# Patient Record
(demographics unavailable — no encounter records)

---

## 2024-11-04 NOTE — IMAGING
[de-identified] : On examination of her right foot and ankle she has swelling and ecchymosis over the lateral aspect of the foot and ankle.  She is mildly tender over the lateral malleolus, she is tender over the ATFL CFL and PTFL.  No tenderness over the medial malleolus or the deltoid ligament.  She is tender over the fifth metatarsal.  No tenderness over the talar dome.  No tenderness over the midfoot or the rest of the metatarsals.  No tenderness over the Lisfranc joint.  No tenderness over the Achilles or the calcaneus, negative Gillespie's test, no calf tenderness.  She is able to dorsiflex and plantarflex ankle but has decreased range of motion.  Sensation is intact throughout, 2+ DP and PT pulses.  On examination of her left arm she has decreased range of motion of the elbow.  She can fully flex but has decreased range of motion with full extension.  She is point tender over the radial head.  No tenderness over the medial or lateral epicondyle.  No tenderness over the olecranon.  She does have an abrasion to the  posterior aspect of the elbow.  No tenderness over the distal bicep or tricep tendons.  No tenderness over the distal right radius or the distal ulna.  Sensation is intact throughout, 2+ radial pulse.  On examination of the right hand she does have some swelling and a slight deformity of the pinky finger.  She does have good range of motion of the finger.  She is able to open and close her fist, there is slight rotation of the finger.  No tenderness over the metacarpals or the rest of the fingers.  Sensation is intact throughout, 2+ radial pulse.  X-rays taken at the hospital reviewed in the office today of the right foot and ankle show a nondisplaced base of the fifth metatarsal fracture.  No obvious fractures in the ankle noted.  No other fractures, dislocations, bony abnormalities.  X-rays of the left forearm reviewed from the hospital and repeat x-rays taken in the office today of the left elbow show a nondisplaced radial neck fracture.  No other fractures noted.  X-rays taken at the hospital and repeat x-rays taken in the office today of the right pinky finger show a displaced proximal phalanx fracture.  The alignment is unchanged from the previous x-rays.  There is some callus formation.

## 2024-11-04 NOTE — HISTORY OF PRESENT ILLNESS
[de-identified] : 60-year-old female is here today for evaluation of her right foot and ankle and her left arm.  Patient states she initially passed out due to low blood sugar on October 25.  She states she hit her right arm and rolled her right ankle.  She went to the hospital and had x-rays taken and states she was told she had no fractures.  She also had a fracture in her pinky finger which happened about 3 weeks ago.  They did x-ray the finger while she was there and put a splint on it.  Since then she has taken the splint off.  She states then while she was getting off of the bus on Halloween she rolled her foot and ankle again and went to the ER the next day.  She states initially they told her she had no fracture and placed her foot in a splint.  She states then they called her and told her there was a fracture in her foot and she should follow-up with orthopedics.  She is here today for further evaluation.  She states her biggest concerns are her foot and ankle, she is still having some pain in her left arm.  She states she has been moving the pinky finger and has not been wearing a splint and does not have much pain in the finger.

## 2024-11-04 NOTE — DISCUSSION/SUMMARY
[de-identified] : At this time her right foot and ankle was placed in a tall cam walker boot.  I discussed with her she can weight-bear as tolerated in the boot.  She did not want a sling for the left elbow so I Ace wrap the elbow for support.  I discussed with her not to do any heavy lifting and continue working on her range of motion.  Her finger fracture is already 3 weeks out.  I did discuss with her that the fracture is displaced, I discussed with her the only way to fix the alignment of the fracture at this point since it is 3 weeks out would be with surgery.  She is not interested in doing any kind of surgery at this time.  I discussed with her she can buddy tape the fingers as needed for comfort.  Will see her back in 2 weeks for repeat evaluation of her right foot and in 2 to 3 weeks for repeat evaluation of her left elbow and pinky finger.  Patient will call me if any other problems or concerns.  Patient verbalized understanding and agreed with the plan, all questions were answered in the office today.